# Patient Record
Sex: FEMALE | Race: BLACK OR AFRICAN AMERICAN | Employment: FULL TIME | URBAN - METROPOLITAN AREA
[De-identification: names, ages, dates, MRNs, and addresses within clinical notes are randomized per-mention and may not be internally consistent; named-entity substitution may affect disease eponyms.]

---

## 2022-01-16 ENCOUNTER — APPOINTMENT (OUTPATIENT)
Dept: GENERAL RADIOLOGY | Age: 37
End: 2022-01-16
Payer: COMMERCIAL

## 2022-01-16 ENCOUNTER — HOSPITAL ENCOUNTER (EMERGENCY)
Age: 37
Discharge: HOME | End: 2022-01-16
Attending: EMERGENCY MEDICINE
Payer: COMMERCIAL

## 2022-01-16 VITALS
HEIGHT: 67 IN | OXYGEN SATURATION: 100 % | HEART RATE: 88 BPM | RESPIRATION RATE: 16 BRPM | SYSTOLIC BLOOD PRESSURE: 125 MMHG | BODY MASS INDEX: 29.66 KG/M2 | TEMPERATURE: 98 F | WEIGHT: 189 LBS | DIASTOLIC BLOOD PRESSURE: 61 MMHG

## 2022-01-16 DIAGNOSIS — S93.602A SPRAIN OF LEFT FOOT, INITIAL ENCOUNTER: Primary | ICD-10-CM

## 2022-01-16 PROCEDURE — 73610 X-RAY EXAM OF ANKLE: CPT

## 2022-01-16 PROCEDURE — 99283 EMERGENCY DEPT VISIT LOW MDM: CPT

## 2022-01-16 PROCEDURE — 73630 X-RAY EXAM OF FOOT: CPT

## 2022-01-16 ASSESSMENT — PAIN DESCRIPTION - DESCRIPTORS: DESCRIPTORS: THROBBING;RADIATING

## 2022-01-16 ASSESSMENT — PAIN DESCRIPTION - PAIN TYPE: TYPE: ACUTE PAIN

## 2022-01-16 ASSESSMENT — PAIN DESCRIPTION - ORIENTATION: ORIENTATION: LEFT

## 2022-01-16 ASSESSMENT — PAIN DESCRIPTION - FREQUENCY: FREQUENCY: CONTINUOUS

## 2022-01-16 ASSESSMENT — PAIN DESCRIPTION - LOCATION: LOCATION: FOOT

## 2022-01-16 ASSESSMENT — PAIN SCALES - GENERAL: PAINLEVEL_OUTOF10: 10

## 2022-01-16 NOTE — ED PROVIDER NOTES
CHIEF COMPLAINT  Foot Pain (patient with left foot pain, started 1.5 days ago. No known injury.)      HISTORY OF PRESENT ILLNESS  Reinier Grimes is a 39 y.o. female without significant history presents for department for evaluation of left foot pain. Patient states that the pain started about 2 days ago. She has not had any injuries to the foot. She has not had any sort of item drop onto her foot. She denies any numbness or tingling. No past medical history on file. No past surgical history on file. No family history on file. Social History     Socioeconomic History    Marital status:      Spouse name: Not on file    Number of children: Not on file    Years of education: Not on file    Highest education level: Not on file   Occupational History    Not on file   Tobacco Use    Smoking status: Not on file    Smokeless tobacco: Not on file   Substance and Sexual Activity    Alcohol use: Not on file    Drug use: Not on file    Sexual activity: Not on file   Other Topics Concern    Not on file   Social History Narrative    Not on file     Social Determinants of Health     Financial Resource Strain:     Difficulty of Paying Living Expenses: Not on file   Food Insecurity:     Worried About Running Out of Food in the Last Year: Not on file    Devin of Food in the Last Year: Not on file   Transportation Needs:     Lack of Transportation (Medical): Not on file    Lack of Transportation (Non-Medical):  Not on file   Physical Activity:     Days of Exercise per Week: Not on file    Minutes of Exercise per Session: Not on file   Stress:     Feeling of Stress : Not on file   Social Connections:     Frequency of Communication with Friends and Family: Not on file    Frequency of Social Gatherings with Friends and Family: Not on file    Attends Cheondoism Services: Not on file    Active Member of Clubs or Organizations: Not on file    Attends Club or Organization Meetings: Not on file   [place] Marital Status: Not on file   Intimate Partner Violence:     Fear of Current or Ex-Partner: Not on file    Emotionally Abused: Not on file    Physically Abused: Not on file    Sexually Abused: Not on file   Housing Stability:     Unable to Pay for Housing in the Last Year: Not on file    Number of [place] in the Last Year: Not on file    Unstable Housing in the Last Year: Not on file     No current facility-administered medications for this encounter. No current outpatient medications on file. No Known Allergies    REVIEW OF SYSTEMS  Review of Systems   Constitutional: Negative for fever. Cardiovascular: Negative for leg swelling. Musculoskeletal: Positive for arthralgias. Skin: Negative for rash. Neurological: Negative for weakness and numbness. PHYSICAL EXAM  /61   Pulse 88   Temp 98 °F (36.7 °C) (Oral)   Resp 16   Ht 5' 7\" (1.702 m)   Wt 189 lb (85.7 kg)   SpO2 100%   BMI 29.60 kg/m²   GENERAL APPEARANCE: Awake and alert. Cooperative. HEAD: Normocephalic. Atraumatic. HEART: RRR. No harsh murmurs. Intact radial pulses 2+ bilaterally. LUNGS: Respirations unlabored without accessory muscle use. EXTREMITIES: No peripheral edema. No acute deformities. There is pain to palpation of the dorsal aspect of the left foot. There is no overlying erythema, induration, crepitance. There is no pain to palpation of the heel, base of the fifth metatarsal or plantar fascia  SKIN: Warm and dry. No acute rashes. NEUROLOGICAL: Alert and oriented X 3. Intact strength in the bilateral lower extremities. LABS  I have reviewed all labs for this visit. No results found for this visit on 01/16/22. RADIOLOGY  X-RAYS:  I have reviewed radiologic plain film image(s). ALL OTHER NON-PLAIN FILM IMAGES SUCH AS CT, ULTRASOUND AND MRI HAVE BEEN READ BY THE RADIOLOGIST.   XR ANKLE LEFT (MIN 3 VIEWS)   Final Result   No acute abnormality         XR FOOT LEFT (MIN 3 VIEWS) Final Result   No acute abnormality                  ED COURSE/MDM  Patient seen and evaluated. Old records reviewed. Labs and imaging reviewed and results discussed with patient. Patient presenting with atraumatic left dorsal foot pain. X-rays were unremarkable. She has no deformity on exam, no known injury, no erythema or signs of infection. Patient was advised on supportive management, well fitting shoes and supportive shoes. She will be provided with podiatry referral.  No mechanism to suggest Lisfranc injury. The patient will be discharged from the emergency department. The patient was counseled on their diagnosis and any medications prescribed. They were advised on the need for PCP followup. They were counseled on the need to return to the emergency department if any of their symptoms were to worsen, change or have any other concerns. Discharged in stable condition. CLINICAL IMPRESSION  1. Sprain of left foot, initial encounter        Blood pressure 125/61, pulse 88, temperature 98 °F (36.7 °C), temperature source Oral, resp. rate 16, height 5' 7\" (1.702 m), weight 189 lb (85.7 kg), SpO2 100 %. Kevin Fonseca was discharged to home in stable condition. This chart was generated in part by using Dragon Dictation system and may contain errors related to that system including errors in grammar, punctuation, and spelling, as well as words and phrases that may be inappropriate. If there are any questions or concerns please feel free to contact the dictating provider for clarification.      Loraine Park MD  01/16/22 [id number]

## 2023-09-06 ENCOUNTER — APPOINTMENT (OUTPATIENT)
Dept: CT IMAGING | Age: 38
End: 2023-09-06
Payer: COMMERCIAL

## 2023-09-06 ENCOUNTER — APPOINTMENT (OUTPATIENT)
Dept: MRI IMAGING | Age: 38
End: 2023-09-06
Payer: COMMERCIAL

## 2023-09-06 ENCOUNTER — HOSPITAL ENCOUNTER (INPATIENT)
Age: 38
LOS: 2 days | Discharge: HOME | End: 2023-09-08
Attending: EMERGENCY MEDICINE | Admitting: HOSPITALIST
Payer: COMMERCIAL

## 2023-09-06 DIAGNOSIS — Z79.899 MEDICAL MARIJUANA USE: ICD-10-CM

## 2023-09-06 DIAGNOSIS — R55 SYNCOPE AND COLLAPSE: Primary | ICD-10-CM

## 2023-09-06 LAB
ALBUMIN SERPL-MCNC: 4.5 G/DL (ref 3.4–5)
ALBUMIN/GLOB SERPL: 1.4 {RATIO} (ref 1.1–2.2)
ALP SERPL-CCNC: 47 U/L (ref 40–129)
ALT SERPL-CCNC: 15 U/L (ref 10–40)
AMPHETAMINES UR QL SCN>1000 NG/ML: ABNORMAL
ANION GAP SERPL CALCULATED.3IONS-SCNC: 11 MMOL/L (ref 3–16)
APAP SERPL-MCNC: <5 UG/ML (ref 10–30)
AST SERPL-CCNC: 19 U/L (ref 15–37)
BACTERIA URNS QL MICRO: ABNORMAL /HPF
BARBITURATES UR QL SCN>200 NG/ML: ABNORMAL
BASE EXCESS BLDV CALC-SCNC: -0.7 MMOL/L (ref -3–3)
BASOPHILS # BLD: 0.1 K/UL (ref 0–0.2)
BASOPHILS NFR BLD: 1.1 %
BENZODIAZ UR QL SCN>200 NG/ML: ABNORMAL
BILIRUB SERPL-MCNC: 0.6 MG/DL (ref 0–1)
BILIRUB UR QL STRIP.AUTO: NEGATIVE
BUN SERPL-MCNC: 14 MG/DL (ref 7–20)
CALCIUM SERPL-MCNC: 9.5 MG/DL (ref 8.3–10.6)
CANNABINOIDS UR QL SCN>50 NG/ML: POSITIVE
CHLORIDE SERPL-SCNC: 100 MMOL/L (ref 99–110)
CLARITY UR: CLEAR
CO2 BLDV-SCNC: 26 MMOL/L
CO2 SERPL-SCNC: 22 MMOL/L (ref 21–32)
COCAINE UR QL SCN: ABNORMAL
COHGB MFR BLDV: 2 % (ref 0–1.5)
COLOR UR: ABNORMAL
CREAT SERPL-MCNC: 1 MG/DL (ref 0.6–1.1)
CRP SERPL-MCNC: <3 MG/L (ref 0–5.1)
DEPRECATED RDW RBC AUTO: 12.9 % (ref 12.4–15.4)
DRUG SCREEN COMMENT UR-IMP: ABNORMAL
EOSINOPHIL # BLD: 0 K/UL (ref 0–0.6)
EOSINOPHIL NFR BLD: 0.6 %
EPI CELLS #/AREA URNS HPF: ABNORMAL /HPF (ref 0–5)
ERYTHROCYTE [SEDIMENTATION RATE] IN BLOOD BY WESTERGREN METHOD: 51 MM/HR (ref 0–20)
ETHANOLAMINE SERPL-MCNC: NORMAL MG/DL (ref 0–0.08)
FENTANYL SCREEN, URINE: ABNORMAL
FLUAV RNA RESP QL NAA+PROBE: NOT DETECTED
FLUBV RNA RESP QL NAA+PROBE: NOT DETECTED
GFR SERPLBLD CREATININE-BSD FMLA CKD-EPI: >60 ML/MIN/{1.73_M2}
GLUCOSE BLD-MCNC: 91 MG/DL (ref 70–99)
GLUCOSE SERPL-MCNC: 97 MG/DL (ref 70–99)
GLUCOSE UR STRIP.AUTO-MCNC: NEGATIVE MG/DL
HCG SERPL QL: NEGATIVE
HCO3 BLDV-SCNC: 24.3 MMOL/L (ref 23–29)
HCT VFR BLD AUTO: 42.8 % (ref 36–48)
HGB BLD-MCNC: 14.2 G/DL (ref 12–16)
HGB UR QL STRIP.AUTO: ABNORMAL
KETONES UR STRIP.AUTO-MCNC: NEGATIVE MG/DL
LEUKOCYTE ESTERASE UR QL STRIP.AUTO: NEGATIVE
LYMPHOCYTES # BLD: 2.3 K/UL (ref 1–5.1)
LYMPHOCYTES NFR BLD: 28.2 %
MAGNESIUM SERPL-MCNC: 2.3 MG/DL (ref 1.8–2.4)
MCH RBC QN AUTO: 30.1 PG (ref 26–34)
MCHC RBC AUTO-ENTMCNC: 33.1 G/DL (ref 31–36)
MCV RBC AUTO: 91.1 FL (ref 80–100)
METHADONE UR QL SCN>300 NG/ML: ABNORMAL
METHGB MFR BLDV: 0.5 %
MONOCYTES # BLD: 0.6 K/UL (ref 0–1.3)
MONOCYTES NFR BLD: 7 %
NEUTROPHILS # BLD: 5.1 K/UL (ref 1.7–7.7)
NEUTROPHILS NFR BLD: 63.1 %
NITRITE UR QL STRIP.AUTO: NEGATIVE
O2 THERAPY: ABNORMAL
OPIATES UR QL SCN>300 NG/ML: ABNORMAL
OXYCODONE UR QL SCN: ABNORMAL
PCO2 BLDV: 41.2 MMHG (ref 40–50)
PCP UR QL SCN>25 NG/ML: ABNORMAL
PERFORMED ON: NORMAL
PH BLDV: 7.39 [PH] (ref 7.35–7.45)
PH UR STRIP.AUTO: 7 [PH] (ref 5–8)
PH UR STRIP: 6 [PH]
PLATELET # BLD AUTO: 316 K/UL (ref 135–450)
PMV BLD AUTO: 8 FL (ref 5–10.5)
PO2 BLDV: 44.6 MMHG (ref 25–40)
POTASSIUM SERPL-SCNC: 4.1 MMOL/L (ref 3.5–5.1)
PROT SERPL-MCNC: 7.8 G/DL (ref 6.4–8.2)
PROT UR STRIP.AUTO-MCNC: NEGATIVE MG/DL
RBC # BLD AUTO: 4.7 M/UL (ref 4–5.2)
RBC #/AREA URNS HPF: ABNORMAL /HPF (ref 0–4)
SALICYLATES SERPL-MCNC: 0.4 MG/DL (ref 15–30)
SAO2 % BLDV: 80 %
SARS-COV-2 RNA RESP QL NAA+PROBE: NOT DETECTED
SODIUM SERPL-SCNC: 133 MMOL/L (ref 136–145)
SP GR UR STRIP.AUTO: 1.01 (ref 1–1.03)
TROPONIN, HIGH SENSITIVITY: <6 NG/L (ref 0–14)
TROPONIN, HIGH SENSITIVITY: <6 NG/L (ref 0–14)
UA DIPSTICK W REFLEX MICRO PNL UR: YES
URN SPEC COLLECT METH UR: ABNORMAL
UROBILINOGEN UR STRIP-ACNC: 0.2 E.U./DL
WBC # BLD AUTO: 8.1 K/UL (ref 4–11)
WBC #/AREA URNS HPF: ABNORMAL /HPF (ref 0–5)

## 2023-09-06 PROCEDURE — 80307 DRUG TEST PRSMV CHEM ANLYZR: CPT

## 2023-09-06 PROCEDURE — 6360000002 HC RX W HCPCS

## 2023-09-06 PROCEDURE — 82077 ASSAY SPEC XCP UR&BREATH IA: CPT

## 2023-09-06 PROCEDURE — 99285 EMERGENCY DEPT VISIT HI MDM: CPT

## 2023-09-06 PROCEDURE — 85652 RBC SED RATE AUTOMATED: CPT

## 2023-09-06 PROCEDURE — A9579 GAD-BASE MR CONTRAST NOS,1ML: HCPCS

## 2023-09-06 PROCEDURE — 6370000000 HC RX 637 (ALT 250 FOR IP)

## 2023-09-06 PROCEDURE — 6370000000 HC RX 637 (ALT 250 FOR IP): Performed by: REGISTERED NURSE

## 2023-09-06 PROCEDURE — 87636 SARSCOV2 & INF A&B AMP PRB: CPT

## 2023-09-06 PROCEDURE — 80143 DRUG ASSAY ACETAMINOPHEN: CPT

## 2023-09-06 PROCEDURE — 86140 C-REACTIVE PROTEIN: CPT

## 2023-09-06 PROCEDURE — 93005 ELECTROCARDIOGRAM TRACING: CPT | Performed by: EMERGENCY MEDICINE

## 2023-09-06 PROCEDURE — 2580000003 HC RX 258

## 2023-09-06 PROCEDURE — 85025 COMPLETE CBC W/AUTO DIFF WBC: CPT

## 2023-09-06 PROCEDURE — 82803 BLOOD GASES ANY COMBINATION: CPT

## 2023-09-06 PROCEDURE — 70450 CT HEAD/BRAIN W/O DYE: CPT

## 2023-09-06 PROCEDURE — 99223 1ST HOSP IP/OBS HIGH 75: CPT | Performed by: PSYCHIATRY & NEUROLOGY

## 2023-09-06 PROCEDURE — 2060000000 HC ICU INTERMEDIATE R&B

## 2023-09-06 PROCEDURE — 84484 ASSAY OF TROPONIN QUANT: CPT

## 2023-09-06 PROCEDURE — 80053 COMPREHEN METABOLIC PANEL: CPT

## 2023-09-06 PROCEDURE — 70553 MRI BRAIN STEM W/O & W/DYE: CPT

## 2023-09-06 PROCEDURE — 80179 DRUG ASSAY SALICYLATE: CPT

## 2023-09-06 PROCEDURE — 83735 ASSAY OF MAGNESIUM: CPT

## 2023-09-06 PROCEDURE — 6360000004 HC RX CONTRAST MEDICATION

## 2023-09-06 PROCEDURE — 81001 URINALYSIS AUTO W/SCOPE: CPT

## 2023-09-06 PROCEDURE — 84703 CHORIONIC GONADOTROPIN ASSAY: CPT

## 2023-09-06 RX ORDER — LEVETIRACETAM 500 MG/1
500 TABLET ORAL 2 TIMES DAILY
Status: DISCONTINUED | OUTPATIENT
Start: 2023-09-06 | End: 2023-09-08 | Stop reason: HOSPADM

## 2023-09-06 RX ORDER — SODIUM CHLORIDE 0.9 % (FLUSH) 0.9 %
5-40 SYRINGE (ML) INJECTION EVERY 12 HOURS SCHEDULED
Status: DISCONTINUED | OUTPATIENT
Start: 2023-09-06 | End: 2023-09-08 | Stop reason: HOSPADM

## 2023-09-06 RX ORDER — CALCIUM CARBONATE-CHOLECALCIFEROL TAB 250 MG-125 UNIT 250-125 MG-UNIT
1 TAB ORAL DAILY
Status: DISCONTINUED | OUTPATIENT
Start: 2023-09-07 | End: 2023-09-08 | Stop reason: HOSPADM

## 2023-09-06 RX ORDER — ZINC GLUCONATE 50 MG
50 TABLET ORAL DAILY
COMMUNITY

## 2023-09-06 RX ORDER — LORAZEPAM 2 MG/ML
1 INJECTION INTRAMUSCULAR ONCE
Status: COMPLETED | OUTPATIENT
Start: 2023-09-06 | End: 2023-09-06

## 2023-09-06 RX ORDER — SODIUM CHLORIDE 0.9 % (FLUSH) 0.9 %
5-40 SYRINGE (ML) INJECTION PRN
Status: DISCONTINUED | OUTPATIENT
Start: 2023-09-06 | End: 2023-09-08 | Stop reason: HOSPADM

## 2023-09-06 RX ORDER — ACETAMINOPHEN 650 MG/1
650 SUPPOSITORY RECTAL EVERY 6 HOURS PRN
Status: DISCONTINUED | OUTPATIENT
Start: 2023-09-06 | End: 2023-09-08 | Stop reason: HOSPADM

## 2023-09-06 RX ORDER — MAGNESIUM 30 MG
30 TABLET ORAL 2 TIMES DAILY
COMMUNITY

## 2023-09-06 RX ORDER — HYDROXYZINE HYDROCHLORIDE 10 MG/1
25 TABLET, FILM COATED ORAL NIGHTLY PRN
Status: DISCONTINUED | OUTPATIENT
Start: 2023-09-06 | End: 2023-09-08 | Stop reason: HOSPADM

## 2023-09-06 RX ORDER — IBUPROFEN 200 MG
1 CAPSULE ORAL DAILY
COMMUNITY

## 2023-09-06 RX ORDER — ONDANSETRON 4 MG/1
4 TABLET, ORALLY DISINTEGRATING ORAL EVERY 8 HOURS PRN
Status: DISCONTINUED | OUTPATIENT
Start: 2023-09-06 | End: 2023-09-08 | Stop reason: HOSPADM

## 2023-09-06 RX ORDER — ONDANSETRON 2 MG/ML
4 INJECTION INTRAMUSCULAR; INTRAVENOUS EVERY 6 HOURS PRN
Status: DISCONTINUED | OUTPATIENT
Start: 2023-09-06 | End: 2023-09-08 | Stop reason: HOSPADM

## 2023-09-06 RX ORDER — VITAMIN B COMPLEX
1000 TABLET ORAL DAILY
Status: DISCONTINUED | OUTPATIENT
Start: 2023-09-06 | End: 2023-09-08 | Stop reason: HOSPADM

## 2023-09-06 RX ORDER — LORAZEPAM 1 MG/1
1 TABLET ORAL ONCE
Status: DISCONTINUED | OUTPATIENT
Start: 2023-09-06 | End: 2023-09-06

## 2023-09-06 RX ORDER — MECOBALAMIN 5000 MCG
5 TABLET,DISINTEGRATING ORAL NIGHTLY
Status: DISCONTINUED | OUTPATIENT
Start: 2023-09-06 | End: 2023-09-08 | Stop reason: HOSPADM

## 2023-09-06 RX ORDER — CHLORAL HYDRATE 500 MG
CAPSULE ORAL DAILY
COMMUNITY

## 2023-09-06 RX ORDER — IBUPROFEN 200 MG
200 CAPSULE ORAL DAILY
Status: DISCONTINUED | OUTPATIENT
Start: 2023-09-06 | End: 2023-09-06 | Stop reason: CLARIF

## 2023-09-06 RX ORDER — POLYETHYLENE GLYCOL 3350 17 G/17G
17 POWDER, FOR SOLUTION ORAL DAILY PRN
Status: DISCONTINUED | OUTPATIENT
Start: 2023-09-06 | End: 2023-09-08 | Stop reason: HOSPADM

## 2023-09-06 RX ORDER — ENOXAPARIN SODIUM 100 MG/ML
40 INJECTION SUBCUTANEOUS DAILY
Status: DISCONTINUED | OUTPATIENT
Start: 2023-09-07 | End: 2023-09-08 | Stop reason: HOSPADM

## 2023-09-06 RX ORDER — PANTOPRAZOLE SODIUM 40 MG/1
40 TABLET, DELAYED RELEASE ORAL
Status: DISCONTINUED | OUTPATIENT
Start: 2023-09-07 | End: 2023-09-08 | Stop reason: HOSPADM

## 2023-09-06 RX ORDER — ACETAMINOPHEN 325 MG/1
650 TABLET ORAL EVERY 6 HOURS PRN
Status: DISCONTINUED | OUTPATIENT
Start: 2023-09-06 | End: 2023-09-08 | Stop reason: HOSPADM

## 2023-09-06 RX ORDER — LACTOBACILLUS RHAMNOSUS GG 10B CELL
1 CAPSULE ORAL
Status: DISCONTINUED | OUTPATIENT
Start: 2023-09-07 | End: 2023-09-08 | Stop reason: HOSPADM

## 2023-09-06 RX ORDER — SODIUM CHLORIDE 9 MG/ML
INJECTION, SOLUTION INTRAVENOUS CONTINUOUS
Status: DISCONTINUED | OUTPATIENT
Start: 2023-09-06 | End: 2023-09-08 | Stop reason: HOSPADM

## 2023-09-06 RX ORDER — SODIUM CHLORIDE 9 MG/ML
INJECTION, SOLUTION INTRAVENOUS PRN
Status: DISCONTINUED | OUTPATIENT
Start: 2023-09-06 | End: 2023-09-08 | Stop reason: HOSPADM

## 2023-09-06 RX ADMIN — Medication 1000 UNITS: at 18:37

## 2023-09-06 RX ADMIN — LEVETIRACETAM 500 MG: 500 TABLET, FILM COATED ORAL at 20:55

## 2023-09-06 RX ADMIN — LEVETIRACETAM 1000 MG: 100 INJECTION, SOLUTION INTRAVENOUS at 13:22

## 2023-09-06 RX ADMIN — GADOTERIDOL 16 ML: 279.3 INJECTION, SOLUTION INTRAVENOUS at 17:55

## 2023-09-06 RX ADMIN — SODIUM CHLORIDE: 9 INJECTION, SOLUTION INTRAVENOUS at 18:38

## 2023-09-06 RX ADMIN — HYDROXYZINE HYDROCHLORIDE 25 MG: 10 TABLET ORAL at 22:31

## 2023-09-06 RX ADMIN — LORAZEPAM 1 MG: 2 INJECTION INTRAMUSCULAR; INTRAVENOUS at 13:06

## 2023-09-06 RX ADMIN — Medication 5 MG: at 22:31

## 2023-09-06 RX ADMIN — Medication 10 ML: at 20:55

## 2023-09-06 ASSESSMENT — PAIN SCALES - GENERAL
PAINLEVEL_OUTOF10: 0
PAINLEVEL_OUTOF10: 0

## 2023-09-06 ASSESSMENT — PAIN - FUNCTIONAL ASSESSMENT: PAIN_FUNCTIONAL_ASSESSMENT: 0-10

## 2023-09-06 NOTE — H&P
Hospital Medicine History & Physical      Date of Admission: 9/6/2023    Date of Service:  Pt seen/examined on 9/6/23     [x]Admitted to Inpatient with expected LOS greater than two midnights due to medical therapy. []Placed in Observation status. Chief Admission Complaint:  increased episodes of passing out    Presenting Admission History: 45 y.o. female with no significant past medical history. Presented to [facility] with complaint of increased episodes of passing out. She states these episodes have been intermittent for the past 6 months, but over the past couple weeks have increased dramatically. She reports the last 2 episodes have been the worst.  Today she was sitting at home when she felt weak, warm, and had vision changes prior to the episode. States she is normally out of it for about 5 minutes, and does not feel like herself for a couple of days. Following the episodes she has a headache, and paresthesia of fingers and toes. Denies neck pain, chest pain, dyspnea, n/v.     Her  is who typically finds her after these episodes. He states occasionally she does make a snoring noise, and sound like she is gasping. States that with today's episode he noticed her to be drooling. Both of them deny loss of bowel or bladder control during episodes.  reports she has had at least 4 episodes today alone. No aggravating/alleviating factors. Assessment/Plan:      Current Principal Problem:  Syncope and collapse    Possible seizure activity. Episode witnessed by this provider: pt stated \"I feel it starting\" then was unable to speak; eyes noted be moving quickly side-side, unable to focus, unresponsive to verbal and tactile stimuli. Ativan 1mg IV was ordered. During episode HR noted to be 70-80 on monitor, strong radial pulse throughout. A second episode occurred while still at bedside as well. EEG, MRI brain w/ and w/o contrast ordered 9/6.   CT head, no acute intracranial

## 2023-09-06 NOTE — CONSULTS
Primary team and family  [x] Imaging personally reviewed and interpreted, includes:    [x] Data Review (any 3)  [x] Collateral history obtained from:    [x] All available Consultant notes from yesterday/today were reviewed  [x] All current labs were reviewed and interpreted for clinical significance   [x] Appropriate follow-up labs were ordered    Data: reviewed   LABS:   Lab Results   Component Value Date/Time     09/06/2023 09:07 AM    K 4.1 09/06/2023 09:07 AM     09/06/2023 09:07 AM    CO2 22 09/06/2023 09:07 AM    BUN 14 09/06/2023 09:07 AM    CREATININE 1.0 09/06/2023 09:07 AM    LABGLOM >60 09/06/2023 09:07 AM    GLUCOSE 97 09/06/2023 09:07 AM    MG 2.30 09/06/2023 09:07 AM    CALCIUM 9.5 09/06/2023 09:07 AM     Lab Results   Component Value Date/Time    WBC 8.1 09/06/2023 09:07 AM    RBC 4.70 09/06/2023 09:07 AM    HGB 14.2 09/06/2023 09:07 AM    HCT 42.8 09/06/2023 09:07 AM    MCV 91.1 09/06/2023 09:07 AM    RDW 12.9 09/06/2023 09:07 AM     09/06/2023 09:07 AM   No results found for: INR, PROTIME    Neuroimaging was independently reviewed by myself and discussed results with the patient  Reviewed notes from different physicians including H&P and ED notes. Reviewed lab and blood testing    Impression:  New onset syncope versus seizure. No risk for epilepsy. So far cryptogenic    Recommendation:  MRI brain with contrast  EEG  Continue Keppra 500 mg bid   Discussed seizure precautions, risk of recurrence and side effect from Keppra  Echo  Will likely need event monitor upon discharge to rule out cardiac arrhythmia or convulsive syncope  Check routine inflammatory markers in the serum  DVT and GI prophylaxis  Hydration  TFT  We will follow      Thank you for referring such patient. If you have any questions regarding my consult note, please don't hesitate to call me. Lalit Lopez MD  [phone]    This dictation was generated by voice recognition computer software.  Although all

## 2023-09-06 NOTE — ED PROVIDER NOTES
[address] [place] [address]  ED     EMERGENCY DEPARTMENT ENCOUNTER            Pt Name: Joanne Andre   MRN: [id number]   [address] [facility], [place] 1985   Date of evaluation: 2023   Provider: Anuradha Jimenez DO   PCP: No primary care provider on file. Note Started: 9:46 AM EDT 23          CHIEF COMPLAINT     Chief Complaint   Patient presents with    Loss of Consciousness     Per patient's partner pt has been having episodes of Lethargy and passing out. Pt was brought responding to verbal stimuli. She states these episodes started a month ago and have gotten progressively worse. HISTORY OF PRESENT ILLNESS:   History from : Patient and Family     Limitations to HPI: Patient with altered mental status upon arrival.    Joanne Andre is a 45 y.o. female who presents to the emergency department with loss of consciousness/altered mental status. History per  as patient arrives with altered mental status. Patient arrives to front door and wheelchair with LOC. Per , patient has been having intermittent episodes over the last couple of months where she has complete loss of consciousness and is unarousable for a couple of minutes at a time followed by return to normal status. No previous work-up. Current episode started just prior to arrival.    Nursing Notes were all reviewed and agreed with, or any disagreements were addressed in the HPI. REVIEW OF SYSTEMS :    Positives and Pertinent negatives as per HPI. MEDICAL HISTORY   has no past medical history on file.     Past Surgical History:   Procedure Laterality Date    BACK SURGERY       SECTION      COSMETIC SURGERY        CURRENTMEDICATIONS       Previous Medications    CALCIUM CITRATE (CALCITRATE) 950 (200 CA) MG TABLET    Take 1 tablet by mouth daily    MAGNESIUM 30 MG TABLET    Take 1 tablet by mouth 2 times daily    OMEGA-3 FATTY ACIDS (FISH OIL) 1000 MG CAPSULE    Take by mouth daily    VITAMIN D 25 MCG Chronic Conditions:  None    Records Reviewed: NA      Disposition Considerations:   I am the Primary Clinician of Record. FINAL IMPRESSION    1. Syncope and collapse    2. Medical marijuana use           DISPOSITION/PLAN   Patient will be admitted for further evaluation and treatment.              (Please note that portions of this note were completed with a voice recognition program.  Efforts were made to edit the dictations but occasionally words are mis-transcribed.)       Jose Antonio Hernadez DO (electronically signed)             Jose Antonio Hernadez DO  09/06/23 153

## 2023-09-07 PROBLEM — R56.9 NEW ONSET SEIZURE (HCC): Status: ACTIVE | Noted: 2023-09-07

## 2023-09-07 LAB
ANION GAP SERPL CALCULATED.3IONS-SCNC: 9 MMOL/L (ref 3–16)
BASOPHILS # BLD: 0 K/UL (ref 0–0.2)
BASOPHILS NFR BLD: 0.5 %
BUN SERPL-MCNC: 14 MG/DL (ref 7–20)
CALCIUM SERPL-MCNC: 8.6 MG/DL (ref 8.3–10.6)
CHLORIDE SERPL-SCNC: 105 MMOL/L (ref 99–110)
CO2 SERPL-SCNC: 23 MMOL/L (ref 21–32)
CREAT SERPL-MCNC: 1 MG/DL (ref 0.6–1.1)
DEPRECATED RDW RBC AUTO: 12.9 % (ref 12.4–15.4)
EKG ATRIAL RATE: 75 BPM
EKG DIAGNOSIS: NORMAL
EKG P AXIS: 68 DEGREES
EKG P-R INTERVAL: 196 MS
EKG Q-T INTERVAL: 370 MS
EKG QRS DURATION: 86 MS
EKG QTC CALCULATION (BAZETT): 413 MS
EKG R AXIS: 31 DEGREES
EKG T AXIS: 35 DEGREES
EKG VENTRICULAR RATE: 75 BPM
EOSINOPHIL # BLD: 0.1 K/UL (ref 0–0.6)
EOSINOPHIL NFR BLD: 1 %
GFR SERPLBLD CREATININE-BSD FMLA CKD-EPI: >60 ML/MIN/{1.73_M2}
GLUCOSE SERPL-MCNC: 87 MG/DL (ref 70–99)
HCT VFR BLD AUTO: 39.8 % (ref 36–48)
HGB BLD-MCNC: 13.3 G/DL (ref 12–16)
LYMPHOCYTES # BLD: 2.6 K/UL (ref 1–5.1)
LYMPHOCYTES NFR BLD: 36.7 %
MCH RBC QN AUTO: 30.5 PG (ref 26–34)
MCHC RBC AUTO-ENTMCNC: 33.4 G/DL (ref 31–36)
MCV RBC AUTO: 91.4 FL (ref 80–100)
MONOCYTES # BLD: 0.5 K/UL (ref 0–1.3)
MONOCYTES NFR BLD: 7.6 %
NEUTROPHILS # BLD: 3.8 K/UL (ref 1.7–7.7)
NEUTROPHILS NFR BLD: 54.2 %
PLATELET # BLD AUTO: 277 K/UL (ref 135–450)
PMV BLD AUTO: 7.9 FL (ref 5–10.5)
POTASSIUM SERPL-SCNC: 4.3 MMOL/L (ref 3.5–5.1)
RBC # BLD AUTO: 4.36 M/UL (ref 4–5.2)
SODIUM SERPL-SCNC: 137 MMOL/L (ref 136–145)
TSH SERPL DL<=0.005 MIU/L-ACNC: 0.98 UIU/ML (ref 0.27–4.2)
WBC # BLD AUTO: 7 K/UL (ref 4–11)

## 2023-09-07 PROCEDURE — 6360000002 HC RX W HCPCS

## 2023-09-07 PROCEDURE — APPSS45 APP SPLIT SHARED TIME 31-45 MINUTES: Performed by: NURSE PRACTITIONER

## 2023-09-07 PROCEDURE — 2060000000 HC ICU INTERMEDIATE R&B

## 2023-09-07 PROCEDURE — 6370000000 HC RX 637 (ALT 250 FOR IP): Performed by: REGISTERED NURSE

## 2023-09-07 PROCEDURE — 85025 COMPLETE CBC W/AUTO DIFF WBC: CPT

## 2023-09-07 PROCEDURE — 84443 ASSAY THYROID STIM HORMONE: CPT

## 2023-09-07 PROCEDURE — C8929 TTE W OR WO FOL WCON,DOPPLER: HCPCS

## 2023-09-07 PROCEDURE — 2580000003 HC RX 258

## 2023-09-07 PROCEDURE — 97165 OT EVAL LOW COMPLEX 30 MIN: CPT

## 2023-09-07 PROCEDURE — 6370000000 HC RX 637 (ALT 250 FOR IP)

## 2023-09-07 PROCEDURE — 36415 COLL VENOUS BLD VENIPUNCTURE: CPT

## 2023-09-07 PROCEDURE — 93010 ELECTROCARDIOGRAM REPORT: CPT | Performed by: INTERNAL MEDICINE

## 2023-09-07 PROCEDURE — 97535 SELF CARE MNGMENT TRAINING: CPT

## 2023-09-07 PROCEDURE — 6360000004 HC RX CONTRAST MEDICATION

## 2023-09-07 PROCEDURE — 80048 BASIC METABOLIC PNL TOTAL CA: CPT

## 2023-09-07 PROCEDURE — 99233 SBSQ HOSP IP/OBS HIGH 50: CPT | Performed by: PSYCHIATRY & NEUROLOGY

## 2023-09-07 RX ADMIN — Medication 1 TABLET: at 09:01

## 2023-09-07 RX ADMIN — Medication 10 ML: at 20:26

## 2023-09-07 RX ADMIN — LEVETIRACETAM 500 MG: 500 TABLET, FILM COATED ORAL at 20:21

## 2023-09-07 RX ADMIN — Medication 1 CAPSULE: at 09:01

## 2023-09-07 RX ADMIN — Medication 5 MG: at 20:21

## 2023-09-07 RX ADMIN — ENOXAPARIN SODIUM 40 MG: 100 INJECTION SUBCUTANEOUS at 09:01

## 2023-09-07 RX ADMIN — Medication 1000 UNITS: at 09:01

## 2023-09-07 RX ADMIN — PERFLUTREN 1.5 ML: 6.52 INJECTION, SUSPENSION INTRAVENOUS at 07:42

## 2023-09-07 RX ADMIN — HYDROXYZINE HYDROCHLORIDE 25 MG: 10 TABLET ORAL at 20:22

## 2023-09-07 RX ADMIN — LEVETIRACETAM 500 MG: 500 TABLET, FILM COATED ORAL at 09:01

## 2023-09-07 ASSESSMENT — PAIN SCALES - GENERAL
PAINLEVEL_OUTOF10: 0

## 2023-09-07 NOTE — PROGRESS NOTES
Hospital Medicine Progress Note      Date of Admission: 9/6/2023  Hospital Day: 2    Chief Admission Complaint:  increased episodes of passing out     Subjective:  reports several episodes overnight and this morning    Presenting Admission History:   45 y.o. female with no significant past medical history. Presented to [facility] with complaint of increased episodes of passing out. She states these episodes have been intermittent for the past 6 months, but over the past couple weeks have increased dramatically. She reports the last 2 episodes have been the worst.  Today she was sitting at home when she felt weak, warm, and had vision changes prior to the episode. States she is normally out of it for about 5 minutes, and does not feel like herself for a couple of days. Following the episodes she has a headache, and paresthesia of fingers and toes. Denies neck pain, chest pain, dyspnea, n/v.   Her  is who typically finds her after these episodes. He states occasionally she does make a snoring noise, and sound like she is gasping. States that with today's episode he noticed her to be drooling. Both of them deny loss of bowel or bladder control during episodes.  reports she has had at least 4 episodes today alone. No aggravating/alleviating factors. Episode witnessed by this provider in ED: pt stated \"I feel it starting\" then was unable to speak; eyes noted be moving quickly side-side, unable to focus, unresponsive to verbal and tactile stimuli. Ativan 1mg IV was ordered. During episode HR noted to be 70-80 on monitor, strong radial pulse throughout. A second episode occurred while still at bedside as well. Assessment/Plan:       Current Principal Problem:  Syncope and collapse     Possible seizure activity. EEG ordered 9/6, will not be performed until 9/8. MRI brain w/ and w/o contrast: no acute findings. CT head, no acute intracranial abnormality.   Continue keppra 500mg The patient called to notify that she did not have any contact from the [facility]. Called Harika Rutledge LCSW and also e-mailed her. She agreed to call the patient by the end of this week. Notified the patient.    []No    ------------------------------------------------------------------------------------------------------------------------------------------------------------------------    MDM      [x] High (any 2)    A. Problems (any 1)  [x] Acute/Chronic Illness/injury posing threat to life or bodily function: new onset seizure vs syncope  [] Severe exacerbation of chronic illness:    ---------------------------------------------------------------------  B. Risk of Treatment (any 1)   [] Drugs/treatments that require intensive monitoring for toxicity include:    [] IV ABX requiring serial renal monitoring for nephrotoxicity     [] Post-Cath serial renal monitoring for Contrast Induced Nephropathy  [] IV Narcotic analgesia for ADR   [] Aggressive IV diuresis requiring serial renal monitoring for electrolyte derangements  [] Hypertonic Saline requiring serial renal monitoring for appropriate electrolyte correction rate   [] Other -    [] Change in code status:    [] Decision to escalate care:    [] Major surgery/procedure with associated risk factors:    ----------------------------------------------------------------------  C. Data (any 2)  [] Discussed management of the case with:    [x] Imaging personally reviewed and interpreted, includes:   [x] Telemetry monitoring     [x] Data Review (any 3)  [] Collateral history obtained from:    [x] All available Consultant notes from yesterday/today were reviewed  [x] All current labs were reviewed and interpreted for clinical significance   [x] Appropriate follow-up labs were ordered    Medications:  Personally reviewed in detail in conjunction w/ labs as documented for evidence of drug toxicity.      Infusion Medications    sodium chloride      sodium chloride 75 mL/hr at 09/06/23 1838     Scheduled Medications    lactobacillus  1 capsule Oral Daily with breakfast    Vitamin D  1,000 Units Oral Daily    sodium chloride flush  5-40 mL IntraVENous 2 times per day    enoxaparin  40

## 2023-09-07 NOTE — FLOWSHEET NOTE
Pt with another episode,  called staff in. Episode lasted 3-4 mins.       09/07/23 1354   Vital Signs   Pulse 87   Respirations 18   /81   MAP (Calculated) 93   Oxygen Therapy   SpO2 99 %   O2 Device None (Room air)

## 2023-09-07 NOTE — PROGRESS NOTES
Occupational Therapy  Facility/Department: [facility] A2 CARD TELEMETRY  Occupational Therapy Initial Assessment and Treatment Note    Name: Tamy Gamboa  : 1985  MRN: [id number]  Date of Service: 2023    Discharge Recommendations:  Home with assist PRN  OT Equipment Recommendations  Equipment Needed: No     Patient Diagnosis(es): The primary encounter diagnosis was Syncope and collapse. A diagnosis of Medical marijuana use was also pertinent to this visit. Past Medical History:  has no past medical history on file. Past Surgical History:  has a past surgical history that includes  section; back surgery; and Cosmetic surgery. Assessment   Performance deficits / Impairments: Decreased functional mobility ; Decreased strength  Assessment: Pt admitted to [facility] [place] 23 after epsiodes of syncope at home. Pt in room with  present upon arrival. Pt's BP monitored throughout session, lying 116/78, sitting /70, standing 117/87. Pt reported she was \"fighting off another weird feeling\" when seated EOB and standing at sink. Pt was leaning on wall while brushing teeth. Pt completed ambulation with CGA and ADLs with SPV. Pt does not need further OT services while in acute care d/t age, PLOF, and level of assist at OT evaluation.   Prognosis: Good  Decision Making: Low Complexity  No Skilled OT: At baseline function  REQUIRES OT FOLLOW-UP: No  Activity Tolerance  Activity Tolerance: Patient Tolerated treatment well;Patient limited by fatigue        Plan   Occupational Therapy Plan  Times Per Week: No further OT     Restrictions  Restrictions/Precautions  Restrictions/Precautions: Fall Risk, Up as Tolerated, General Precautions, Seizure  Required Braces or Orthoses?: No    Subjective   General  Chart Reviewed: Yes, History and Physical  Patient assessed for rehabilitation services?: Yes  Family / Caregiver Present: Yes ()  Referring Practitioner: Ayan Jeffrey  Diagnosis: Syncope and collapse  denies Sit: Stand by assistance  Sit to Supine: Stand by assistance  Transfers  Stand Step Transfers: Contact guard assistance  Sit to stand: Stand by assistance  Stand to sit: Stand by assistance  Vision  Vision: Impaired  Vision Exceptions: Wears glasses at all times  Hearing  Hearing: Within functional limits  Cognition  Overall Cognitive Status: WNL  Orientation  Overall Orientation Status: Within Normal Limits  Orientation Level: Oriented X4       Education Given To: Patient; Family  Education Provided: Role of Therapy;Plan of Care;ADL Adaptive Strategies  Education Provided Comments: disease specific  Education Method: Verbal  Barriers to Learning: None  Education Outcome: Verbalized understanding;Demonstrated understanding   Disease Specific Education: Pt educated on importance of OOB mobility, prevention of complications of bedrest, and general safety during hospitalization.  Pt verbalized understanding    AM-PAC Score   AM-PAC Daily Activity - Inpatient   How much help is needed for putting on and taking off regular lower body clothing?: A Little  How much help is needed for bathing (which includes washing, rinsing, drying)?: A Little  How much help is needed for toileting (which includes using toilet, bedpan, or urinal)?: None  How much help is needed for putting on and taking off regular upper body clothing?: None  How much help is needed for taking care of personal grooming?: None  How much help for eating meals?: None  AM-PAC Inpatient Daily Activity Raw Score: 22  AM-PAC Inpatient ADL T-Scale Score : 47.1  ADL Inpatient CMS 0-100% Score: 25.8  ADL Inpatient CMS G-Code Modifier : CJ    Goals  Patient Goals   Patient goals : No further OT       Therapy Time   Individual Concurrent Group Co-treatment   Time In 1446         Time Out 1524         Minutes 38         Timed Code Treatment Minutes: 28 Minutes (10 minute eval)       Jia Mccartney OT

## 2023-09-07 NOTE — CARE COORDINATION
Spoke with RN and Neuro NP. Plan is for EEG tomorrow and then will likely discharge to home with no needs. Patient is from home with her  and kids. She is independent at home, works and drives. She has insurance and a PCP. No needs from .

## 2023-09-07 NOTE — PROGRESS NOTES
illness with exacerbation, progression or side effect of treatment or  []     2 or more stable chronic illnesses or  []     1 acute illness with systemic symptoms     ---------------------------------------------------------------------  B. Risk of Treatment (any 1)   [] Drugs/treatments that require intensive monitoring for toxicity include:    [] Change in code status:    [] Decision to escalate care:    [] Major surgery/procedure with associated risk factors:    [] Prescription drug management  ----------------------------------------------------------------------  [] High (any 2)  [x] Moderate (any 1)    C.  Data (any 2 for high and any 1 for moderate)  [x] Discussed management of the case with:  hospitalist  [x] Imaging personally reviewed and interpreted, includes:  MRI of brain  [x] Data Review (any 3)  [] Collateral history obtained from:    [x] All available Consultant notes from yesterday/today were reviewed  [x] All current labs were reviewed and interpreted for clinical significance   [] Appropriate follow-up labs were ordered      Data  LABS:   Lab Results   Component Value Date/Time     09/07/2023 05:08 AM    K 4.3 09/07/2023 05:08 AM     09/07/2023 05:08 AM    CO2 23 09/07/2023 05:08 AM    BUN 14 09/07/2023 05:08 AM    CREATININE 1.0 09/07/2023 05:08 AM    LABGLOM >60 09/07/2023 05:08 AM    GLUCOSE 87 09/07/2023 05:08 AM    MG 2.30 09/06/2023 09:07 AM    CALCIUM 8.6 09/07/2023 05:08 AM     Lab Results   Component Value Date/Time    WBC 7.0 09/07/2023 05:08 AM    RBC 4.36 09/07/2023 05:08 AM    HGB 13.3 09/07/2023 05:08 AM    HCT 39.8 09/07/2023 05:08 AM    MCV 91.4 09/07/2023 05:08 AM    RDW 12.9 09/07/2023 05:08 AM     09/07/2023 05:08 AM   No results found for: INR, PROTIME    Neuroimaging was independently reviewed by me and discussed results with the patient  I reviewed blood testing and other test results and discussed results with the patient          Impression:    New onset syncope versus seizure. No risk for epilepsy. So far cryptogenic. MRI of brain was negative for acute findings. Inflammatory markers were negative. TSH nromal       Recommendation    EEG pending. ECHO pending. Monitor on tele. Check TSH. Continue Keppra 500 mg BID at discharge, please. Discussed with patient and her  that if spells decrease on Keppra, the medication is working and spells are likely seizure. IF spells do not decrease or worsen, she needs cEEG and video monitoring at [address]. NO DRIVING for 3 months and cleared by a physician discussed with the patient and her . Recommend use of contraception while on AED to avoid tetragenic effects. Urine pregnancy was negative. Nia Reich CNP    Attending Supervising Physician's Attestation Statement   I reviewed and agree with the findings and plan as documented in NP consult note   The patient had an episode this morning while discussion with her family  She became abruptly unresponsive with eye closure and eye fluttering. She was able to follow direction with me and only lasted for few seconds with no postictal state or tongue biting or witnessed convulsion. So far no clear episodes or recurrent spells. The episode that I witnessed today, likely nonepileptic. Awaiting EEG and would recommend continue with Keppra for now giving frequent episodes. Side effect discussed in details with the patient  Event monitor upon discharge  Seizure precaution discussed including risk of driving, falling and injury. She voiced understanding  Follow-up with  epilepsy clinic if she continues to have spells on Keppra to consider EMU evaluation especially if event monitor showed no evidence of arrhythmia. Discussed with primary team, family and patient    Electronically signed by Raphael Espinoza MD on 9/7/23 at 2:52 PM EDT       This dictation was generated by voice recognition computer software.  Although

## 2023-09-07 NOTE — FLOWSHEET NOTE
called to say pt was having another episode. Walked into room, VSS. Episode lasted 10 mins.  Pt c/o feeling tired upon coming out of episode       09/07/23 1515   Vital Signs   Pulse 86   Heart Rate Source Monitor   Respirations 20   /78   MAP (Calculated) 91   BP Location Right upper arm   BP Method Automatic   Patient Position Semi fowlers   Level of Consciousness 0

## 2023-09-07 NOTE — PROGRESS NOTES
Pt spouse called out that pt was having \"an episode\" Pt unable to open eyes but when asked \"can you hear me\" Pt slowly nodded head, pt able to squeeze fingers during episode with weak strength. Multiple gasps of breath throughout episode, 20 RR per min. Episode lasted about 5-6 min. When coming out of episode pt aware of staff at bedside and , pt states she feels weak and tired.    Vitals:    09/07/23 0945   BP: 113/70   Pulse: 90   Resp:    Temp:    SpO2: 100%

## 2023-09-07 NOTE — PROGRESS NOTES
Physical Therapy    Attempted to see patient this afternoon. Per Rn pt is currently having one of her episodes and to hold PT this afternoon. Will follow up at a later date as pt is appropriate and schedule permits. Thank you.      Maynor Cool PT, DPT

## 2023-09-07 NOTE — FLOWSHEET NOTE
Pt  called stating pt was having another episode. Pt responds to voice. Episode lasted about 4 mins.   After episode pt with c/o tiredness, and the same numbness down the left side of her face which she has with the episodes       09/07/23 1203   Vital Signs   Pulse 90   Heart Rate Source Monitor   /63   MAP (Calculated) 77   Oxygen Therapy   SpO2 100 %   O2 Device None (Room air)

## 2023-09-08 VITALS
TEMPERATURE: 98.2 F | HEART RATE: 84 BPM | SYSTOLIC BLOOD PRESSURE: 121 MMHG | DIASTOLIC BLOOD PRESSURE: 92 MMHG | BODY MASS INDEX: 28.25 KG/M2 | RESPIRATION RATE: 18 BRPM | OXYGEN SATURATION: 100 % | WEIGHT: 180 LBS | HEIGHT: 67 IN

## 2023-09-08 PROCEDURE — 6360000002 HC RX W HCPCS

## 2023-09-08 PROCEDURE — 95819 EEG AWAKE AND ASLEEP: CPT

## 2023-09-08 PROCEDURE — 6370000000 HC RX 637 (ALT 250 FOR IP)

## 2023-09-08 PROCEDURE — 95816 EEG AWAKE AND DROWSY: CPT | Performed by: PSYCHIATRY & NEUROLOGY

## 2023-09-08 PROCEDURE — 99232 SBSQ HOSP IP/OBS MODERATE 35: CPT | Performed by: PSYCHIATRY & NEUROLOGY

## 2023-09-08 PROCEDURE — 2580000003 HC RX 258

## 2023-09-08 RX ORDER — PANTOPRAZOLE SODIUM 40 MG/1
40 TABLET, DELAYED RELEASE ORAL
Qty: 30 TABLET | Refills: 3 | Status: SHIPPED | OUTPATIENT
Start: 2023-09-09

## 2023-09-08 RX ORDER — LACTOBACILLUS RHAMNOSUS GG 10B CELL
1 CAPSULE ORAL
Qty: 30 CAPSULE | Refills: 0 | Status: SHIPPED | OUTPATIENT
Start: 2023-09-09

## 2023-09-08 RX ORDER — LEVETIRACETAM 500 MG/1
500 TABLET ORAL 2 TIMES DAILY
Qty: 60 TABLET | Refills: 1 | Status: SHIPPED | OUTPATIENT
Start: 2023-09-08

## 2023-09-08 RX ADMIN — SODIUM CHLORIDE: 9 INJECTION, SOLUTION INTRAVENOUS at 08:37

## 2023-09-08 RX ADMIN — Medication 10 ML: at 08:33

## 2023-09-08 RX ADMIN — Medication 1 TABLET: at 08:32

## 2023-09-08 RX ADMIN — ENOXAPARIN SODIUM 40 MG: 100 INJECTION SUBCUTANEOUS at 08:32

## 2023-09-08 RX ADMIN — LEVETIRACETAM 500 MG: 500 TABLET, FILM COATED ORAL at 08:32

## 2023-09-08 RX ADMIN — Medication 1000 UNITS: at 08:32

## 2023-09-08 RX ADMIN — Medication 1 CAPSULE: at 08:32

## 2023-09-08 ASSESSMENT — PAIN SCALES - GENERAL
PAINLEVEL_OUTOF10: 0
PAINLEVEL_OUTOF10: 0

## 2023-09-08 NOTE — PROGRESS NOTES
Patient discharge completed. IV and tele removed. Discharge information included information on diagnosis including signs and symptoms, complications and when to seek medical attention. Information on new medications also provided included use for the medication, side effects and when to call the doctor. Patient verbalized understanding of all discharge information. Patient escorted out by staff with all documented belongings. Home with .

## 2023-09-08 NOTE — PROGRESS NOTES
Patient has unresponsive episode at 0915. VS stable during. Unresponsive for 5 mins. Patient stated she felt sleepy after episode. During EEG patient had 2 more unresponsive episodes while VS remained stable.

## 2023-09-08 NOTE — PROGRESS NOTES
Hospital Medicine Progress Note      Date of Admission: 9/6/2023  Hospital Day: 3    Chief Admission Complaint:  increased episodes of passing out     Subjective:  reports several episodes overnight and this morning    Presenting Admission History:   45 y.o. female with no significant past medical history. Presented to [facility] with complaint of increased episodes of passing out. She states these episodes have been intermittent for the past 6 months, but over the past couple weeks have increased dramatically. She reports the last 2 episodes have been the worst.  Today she was sitting at home when she felt weak, warm, and had vision changes prior to the episode. States she is normally out of it for about 5 minutes, and does not feel like herself for a couple of days. Following the episodes she has a headache, and paresthesia of fingers and toes. Denies neck pain, chest pain, dyspnea, n/v.   Her  is who typically finds her after these episodes. He states occasionally she does make a snoring noise, and sound like she is gasping. States that with today's episode he noticed her to be drooling. Both of them deny loss of bowel or bladder control during episodes.  reports she has had at least 4 episodes today alone. No aggravating/alleviating factors. Episode witnessed by this provider in ED: pt stated \"I feel it starting\" then was unable to speak; eyes noted be moving quickly side-side, unable to focus, unresponsive to verbal and tactile stimuli. Ativan 1mg IV was ordered. During episode HR noted to be 70-80 on monitor, strong radial pulse throughout. A second episode occurred while still at bedside as well. Assessment/Plan:       Current Principal Problem:  Syncope and collapse     Possible seizure activity. EEG performed 9/8, results pending at this time. MRI brain w/ and w/o contrast: no acute findings. CT head, no acute intracranial abnormality.   Continue keppra 500mg every []No    ------------------------------------------------------------------------------------------------------------------------------------------------------------------------    MDM      [x] High (any 2)    A. Problems (any 1)  [x] Acute/Chronic Illness/injury posing threat to life or bodily function: new onset seizure vs syncope  [] Severe exacerbation of chronic illness:    ---------------------------------------------------------------------  B. Risk of Treatment (any 1)   [] Drugs/treatments that require intensive monitoring for toxicity include:    [] IV ABX requiring serial renal monitoring for nephrotoxicity     [] Post-Cath serial renal monitoring for Contrast Induced Nephropathy  [] IV Narcotic analgesia for ADR   [] Aggressive IV diuresis requiring serial renal monitoring for electrolyte derangements  [] Hypertonic Saline requiring serial renal monitoring for appropriate electrolyte correction rate   [] Other -    [] Change in code status:    [] Decision to escalate care:    [] Major surgery/procedure with associated risk factors:    ----------------------------------------------------------------------  C. Data (any 2)  [] Discussed management of the case with:    [x] Imaging personally reviewed and interpreted, includes:   [x] Telemetry monitoring     [x] Data Review (any 3)  [] Collateral history obtained from:    [x] All available Consultant notes from yesterday/today were reviewed  [x] All current labs were reviewed and interpreted for clinical significance   [x] Appropriate follow-up labs were ordered    Medications:  Personally reviewed in detail in conjunction w/ labs as documented for evidence of drug toxicity.      Infusion Medications    sodium chloride      sodium chloride 75 mL/hr at 09/08/23 6506     Scheduled Medications    lactobacillus  1 capsule Oral Daily with breakfast    Vitamin D  1,000 Units Oral Daily    sodium chloride flush  5-40 mL IntraVENous 2 times per day    enoxaparin  40

## 2023-09-08 NOTE — PLAN OF CARE
Problem: Discharge Planning  Goal: Discharge to home or other facility with appropriate resources  Outcome: Progressing  Flowsheets (Taken 9/7/2023 2135 by Joe Jasmine, DAVID)  Discharge to home or other facility with appropriate resources:   Identify barriers to discharge with patient and caregiver   Arrange for needed discharge resources and transportation as appropriate   Identify discharge learning needs (meds, wound care, etc)     Problem: Pain  Goal: Verbalizes/displays adequate comfort level or baseline comfort level  Outcome: Progressing  Flowsheets (Taken 9/7/2023 2135 by Joe Jasmine RN)  Verbalizes/displays adequate comfort level or baseline comfort level:   Encourage patient to monitor pain and request assistance   Assess pain using appropriate pain scale   Implement non-pharmacological measures as appropriate and evaluate response     Problem: Safety - Adult  Goal: Free from fall injury  Outcome: Progressing     Problem: ABCDS Injury Assessment  Goal: Absence of physical injury  Outcome: Progressing

## 2023-09-08 NOTE — PROCEDURES
Patient: Mariely Simpson    MR Number: [id number]  YOB: 1985  Date of Visit: 9/8/2023    Clinical History:  The patient is a 45y.o. years old female with recurrent spells . Method: The EEG was performed utilizing the international 10/20 of electrode placements of both referential and bipolar montages. The patient was awake and drowsy through out the recording. Photic stimulation was performed. Findings: The background of the EEG showed normal alpha posterior background of 9-10- HZ and amplitude of 20-40 UV. This background was symmetric, waxing and waning, and reactive with eye opening and closure. As the patient became drowsy, generalized diffuse slowing was seen through recording at 6-7 HZ. This generalized slowing was symmetric, non rhythmical, and continuous. No spike or sharp waves were seen. Photic stimulation did not activate EEG. Impression: This EEG  is within normal limits. There is no evidence of epileptiform discharges, focal, or lateralizing abnormalities.       Yulisa Talbot MD      Board certified in clinical neurophysiology

## 2023-09-08 NOTE — PROGRESS NOTES
Physical Therapy  PT orders received, chart reviewed. Per discussion with OT and RN, pt near baseline level. Spoke with pt at bedside, declines PT needs prior to d/c. Please re-order if new need arise.     Zuleima Leija, PT, DPT

## 2023-09-09 NOTE — DISCHARGE SUMMARY
Hospital Medicine Discharge Summary    Patient: Tamy Gamboa   : 1985     Admit Date: 2023   Discharge Date: 2023    Disposition:  [x]Home   []HHC  []SNF  []Acute Rehab  []LTAC  []Hospice  Code status:  [x]Full  []DNR/CCA  []Limited (DNR/CCA with Do Not Intubate)  []DNRCC  Condition at Discharge: Stable  Primary Care Provider: No primary care provider on file. Admitting Provider: Torsten Borja MD  Discharge Provider: ÁLVARO Terrell - CNP     Discharge Diagnoses: Active Hospital Problems    Diagnosis     New onset seizure ([address]) [R56.9]     Syncope and collapse [R55]        Presenting Admission History:  45 y.o. female with no significant past medical history. Presented to [facility] with complaint of increased episodes of passing out. She states these episodes have been intermittent for the past 6 months, but over the past couple weeks have increased dramatically. She reports the last 2 episodes have been the worst.  Today she was sitting at home when she felt weak, warm, and had vision changes prior to the episode. States she is normally out of it for about 5 minutes, and does not feel like herself for a couple of days. Following the episodes she has a headache, and paresthesia of fingers and toes. Denies neck pain, chest pain, dyspnea, n/v.   Her  is who typically finds her after these episodes. He states occasionally she does make a snoring noise, and sound like she is gasping. States that with today's episode he noticed her to be drooling. Both of them deny loss of bowel or bladder control during episodes.  reports she has had at least 4 episodes today alone. No aggravating/alleviating factors. Episode witnessed by this provider in ED: pt stated \"I feel it starting\" then was unable to speak; eyes noted be moving quickly side-side, unable to focus, unresponsive to verbal and tactile stimuli. Ativan 1mg IV was ordered.   During episode HR noted to be air cells demonstrate no acute abnormality. SOFT TISSUES/SKULL:  No acute abnormality of the visualized skull or soft tissues. No acute intracranial abnormality. Consults:     IP CONSULT TO NEUROLOGY    Labs:     Recent Labs     09/07/23  0508   WBC 7.0   HGB 13.3   HCT 39.8        Recent Labs     09/07/23  0508      K 4.3      CO2 23   BUN 14   CREATININE 1.0   CALCIUM 8.6     No results for input(s): \"PROBNP\", \"TROPHS\" in the last 72 hours. No results for input(s): \"LABA1C\" in the last 72 hours. No results for input(s): \"AST\", \"ALT\", \"BILIDIR\", \"BILITOT\", \"ALKPHOS\" in the last 72 hours.   Recent Labs     09/07/23  0508   TSH 0.98       Urine Cultures: No results found for: \"LABURIN\"  Blood Cultures: No results found for: \"BC\"  No results found for: \"BLOODCULT2\"  Organism: No results found for: \"ORG\"    Signed:    Nellie Preston, APRN - CNP

## 2023-09-12 ENCOUNTER — TELEPHONE (OUTPATIENT)
Dept: CARDIOLOGY | Age: 38
End: 2023-09-12

## 2023-09-12 NOTE — TELEPHONE ENCOUNTER
Spoke with pt's  and arranged monitor placement for 9/13/23 at [address] [place] [address]

## 2023-09-12 NOTE — TELEPHONE ENCOUNTER
Patient was in hospital and discharged; RN forgot to call for monitor. Patient is wanting to get monitor placed at office, front can we place patient on lab schedule please? Order is in [id number] [place] [address] 15 for monitor.

## 2023-09-13 ENCOUNTER — TELEPHONE (OUTPATIENT)
Dept: CARDIOLOGY CLINIC | Age: 38
End: 2023-09-13

## 2023-09-13 ENCOUNTER — TELEPHONE (OUTPATIENT)
Dept: NEUROLOGY | Age: 38
End: 2023-09-13

## 2023-09-13 DIAGNOSIS — R56.9 NEW ONSET SEIZURE (HCC): Primary | ICD-10-CM

## 2023-09-13 NOTE — TELEPHONE ENCOUNTER
referral faxed over to [phone]. [facility] for referral per Dr Patricia Henson.

## 2023-09-13 NOTE — TELEPHONE ENCOUNTER
Spoke with Patient and she is aware that the [facility] referral was placed and sent via fax today 9/13/23 to [phone]    Writer also provided Patient with the [facility] EMU office phone number  [phone]

## 2023-09-13 NOTE — TELEPHONE ENCOUNTER
Monitor placed by [address] [place] [address]  Length of monitor 28 days  Monitor ordered by Joselito PRATER  Serial number [id number]  Kit ID [id number]  Activation successful prior to pt leaving office?  Yes

## 2023-09-13 NOTE — TELEPHONE ENCOUNTER
Tried to call patient on  9/13/23 @ 0918 voicemail was full and unable to leave message but referral is being sent to [address] [place] [address] for the patient. Will follow up.

## 2023-09-27 ENCOUNTER — TELEPHONE (OUTPATIENT)
Dept: INTERNAL MEDICINE CLINIC | Age: 38
End: 2023-09-27

## 2023-09-27 NOTE — TELEPHONE ENCOUNTER
Call from patient regarding EMU referral to [facility]. Callback on 9/27/23 @ 1:42, no answer, unable to leave voicemail.      Will follow up

## 2023-10-13 DIAGNOSIS — R55 SYNCOPE AND COLLAPSE: ICD-10-CM

## 2023-10-13 DIAGNOSIS — R55 SYNCOPE AND COLLAPSE: Primary | ICD-10-CM

## 2023-10-16 ENCOUNTER — TELEPHONE (OUTPATIENT)
Dept: CARDIOLOGY CLINIC | Age: 38
End: 2023-10-16

## 2023-10-16 NOTE — TELEPHONE ENCOUNTER
----- Message from Reese Mcdonald DO sent at 10/16/2023  8:41 AM EDT -----  Monitor shows predominant sinus rhythm with rare PACs and rare PVCs. No other significant arrhythmias were observed.

## 2023-10-16 NOTE — TELEPHONE ENCOUNTER
Pt returned call. Message given. Pt would like more information as to what pvc and pac's are.  Best number is [phone]

## 2025-08-30 ENCOUNTER — HOSPITAL ENCOUNTER (EMERGENCY)
Age: 40
Discharge: HOME | End: 2025-08-30
Payer: COMMERCIAL

## 2025-08-30 ENCOUNTER — APPOINTMENT (OUTPATIENT)
Dept: CT IMAGING | Age: 40
End: 2025-08-30
Payer: COMMERCIAL

## 2025-08-30 VITALS
BODY MASS INDEX: 24.98 KG/M2 | TEMPERATURE: 98.6 F | HEART RATE: 75 BPM | WEIGHT: 164.8 LBS | RESPIRATION RATE: 16 BRPM | SYSTOLIC BLOOD PRESSURE: 119 MMHG | DIASTOLIC BLOOD PRESSURE: 71 MMHG | HEIGHT: 68 IN | OXYGEN SATURATION: 95 %

## 2025-08-30 DIAGNOSIS — M54.12 CERVICAL RADICULOPATHY: ICD-10-CM

## 2025-08-30 DIAGNOSIS — M50.30 BULGING OF CERVICAL INTERVERTEBRAL DISC: Primary | ICD-10-CM

## 2025-08-30 LAB — HCG UR QL: NEGATIVE

## 2025-08-30 PROCEDURE — 6360000002 HC RX W HCPCS

## 2025-08-30 PROCEDURE — 72125 CT NECK SPINE W/O DYE: CPT

## 2025-08-30 PROCEDURE — 6370000000 HC RX 637 (ALT 250 FOR IP)

## 2025-08-30 PROCEDURE — 96372 THER/PROPH/DIAG INJ SC/IM: CPT

## 2025-08-30 PROCEDURE — 99284 EMERGENCY DEPT VISIT MOD MDM: CPT

## 2025-08-30 PROCEDURE — 84703 CHORIONIC GONADOTROPIN ASSAY: CPT

## 2025-08-30 RX ORDER — METHOCARBAMOL 750 MG/1
750 TABLET, FILM COATED ORAL 4 TIMES DAILY PRN
Qty: 40 TABLET | Refills: 0 | Status: SHIPPED | OUTPATIENT
Start: 2025-08-30 | End: 2025-09-09

## 2025-08-30 RX ORDER — HYDROCODONE BITARTRATE AND ACETAMINOPHEN 5; 325 MG/1; MG/1
2 TABLET ORAL ONCE
Status: COMPLETED | OUTPATIENT
Start: 2025-08-30 | End: 2025-08-30

## 2025-08-30 RX ORDER — LIDOCAINE 4 G/G
2 PATCH TOPICAL ONCE
Status: DISCONTINUED | OUTPATIENT
Start: 2025-08-30 | End: 2025-08-30 | Stop reason: HOSPADM

## 2025-08-30 RX ORDER — METHYLPREDNISOLONE 4 MG/1
TABLET ORAL
Qty: 21 TABLET | Refills: 0 | Status: SHIPPED | OUTPATIENT
Start: 2025-08-30 | End: 2025-09-05

## 2025-08-30 RX ORDER — METHOCARBAMOL 750 MG/1
1500 TABLET, FILM COATED ORAL ONCE
Status: COMPLETED | OUTPATIENT
Start: 2025-08-30 | End: 2025-08-30

## 2025-08-30 RX ORDER — KETOROLAC TROMETHAMINE 30 MG/ML
30 INJECTION, SOLUTION INTRAMUSCULAR; INTRAVENOUS ONCE
Status: COMPLETED | OUTPATIENT
Start: 2025-08-30 | End: 2025-08-30

## 2025-08-30 RX ORDER — LIDOCAINE 4 G/G
1 PATCH TOPICAL DAILY
Qty: 30 PATCH | Refills: 0 | Status: SHIPPED | OUTPATIENT
Start: 2025-08-30 | End: 2025-09-29

## 2025-08-30 RX ADMIN — HYDROCODONE BITARTRATE AND ACETAMINOPHEN 2 TABLET: 5; 325 TABLET ORAL at 20:14

## 2025-08-30 RX ADMIN — KETOROLAC TROMETHAMINE 30 MG: 30 INJECTION, SOLUTION INTRAMUSCULAR at 18:58

## 2025-08-30 RX ADMIN — METHOCARBAMOL 1500 MG: 750 TABLET ORAL at 20:14

## 2025-08-30 ASSESSMENT — PAIN - FUNCTIONAL ASSESSMENT
PAIN_FUNCTIONAL_ASSESSMENT: 0-10
PAIN_FUNCTIONAL_ASSESSMENT: PREVENTS OR INTERFERES SOME ACTIVE ACTIVITIES AND ADLS

## 2025-08-30 ASSESSMENT — PAIN DESCRIPTION - ORIENTATION
ORIENTATION: LEFT

## 2025-08-30 ASSESSMENT — PAIN DESCRIPTION - DESCRIPTORS
DESCRIPTORS: ACHING
DESCRIPTORS: DISCOMFORT;SHARP
DESCRIPTORS: ACHING
DESCRIPTORS: ACHING

## 2025-08-30 ASSESSMENT — PAIN DESCRIPTION - PAIN TYPE: TYPE: ACUTE PAIN

## 2025-08-30 ASSESSMENT — PAIN DESCRIPTION - LOCATION
LOCATION: NECK;SHOULDER
LOCATION: SHOULDER
LOCATION: SHOULDER

## 2025-08-30 ASSESSMENT — PAIN SCALES - GENERAL
PAINLEVEL_OUTOF10: 10
PAINLEVEL_OUTOF10: 7